# Patient Record
Sex: MALE | Race: BLACK OR AFRICAN AMERICAN | NOT HISPANIC OR LATINO | URBAN - METROPOLITAN AREA
[De-identification: names, ages, dates, MRNs, and addresses within clinical notes are randomized per-mention and may not be internally consistent; named-entity substitution may affect disease eponyms.]

---

## 2018-09-04 ENCOUNTER — IMPORTED ENCOUNTER (OUTPATIENT)
Dept: URBAN - METROPOLITAN AREA CLINIC 31 | Facility: CLINIC | Age: 53
End: 2018-09-04

## 2018-09-04 PROBLEM — E11.9: Noted: 2018-09-04

## 2018-09-04 PROCEDURE — 92004 COMPRE OPH EXAM NEW PT 1/>: CPT

## 2018-09-04 NOTE — PATIENT DISCUSSION
DM II without sign of Diabetic Retinopathy OU:  Discussed the pathophysiology of diabetes and its effect on the eye. Stressed the importance of regular followup and good control of BS BP and Lipids to avoid future complications. Return for an appointment in 12 months for comprehensive exam. with Dr. Yohana Poole.

## 2019-11-18 ENCOUNTER — IMPORTED ENCOUNTER (OUTPATIENT)
Dept: URBAN - METROPOLITAN AREA CLINIC 31 | Facility: CLINIC | Age: 54
End: 2019-11-18

## 2019-11-18 PROBLEM — E11.3293: Noted: 2019-11-18

## 2019-11-18 PROBLEM — E11.9: Noted: 2019-11-18

## 2019-11-18 PROBLEM — H25.13: Noted: 2019-11-18

## 2019-11-18 PROBLEM — E11.3291: Noted: 2019-11-18

## 2019-11-18 PROCEDURE — 92014 COMPRE OPH EXAM EST PT 1/>: CPT

## 2019-11-18 PROCEDURE — 92015 DETERMINE REFRACTIVE STATE: CPT

## 2019-11-18 NOTE — PATIENT DISCUSSION
DM II without sign of Diabetic Retinopathy OS:  Discussed the pathophysiology of diabetes and its effect on the eye. Stressed the importance of regular followup and good control of BS BP and Lipids to avoid future complications.

## 2019-11-18 NOTE — PATIENT DISCUSSION
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 Stressed the importance of regular followup and good control of BS BP and Lipids to avoid future complications.

## 2019-11-18 NOTE — PATIENT DISCUSSION
Nuclear Sclerotic Cataract OU: Explained how cataracts can effect vision. Recommend clinical observation. The patient was advised to contact us if any change or worsening of vision. Final glasses rx given today.

## 2019-11-18 NOTE — PATIENT DISCUSSION
1. DM II with mild Non-proliferative Diabetic Retinopathy OD:  Discussed the pathophysiology of diabetes and its effect on the eye. Stressed the importance of regular followup and good control of BS BP and Lipids to avoid future complications. 2.  DM II without sign of Diabetic Retinopathy OS:  Discussed the pathophysiology of diabetes and its effect on the eye. Stressed the importance of regular followup and good control of BS BP and Lipids to avoid future complications. 3. Nuclear Sclerotic Cataract OU: Explained how cataracts can effect vision. Recommend clinical observation. The patient was advised to contact us if any change or worsening of vision. Final glasses rx given today. Return for an appointment in 6 months for dilated fundus exam. F/U DM with Dr. Chela Gabriel.

## 2020-05-20 ENCOUNTER — IMPORTED ENCOUNTER (OUTPATIENT)
Dept: URBAN - METROPOLITAN AREA CLINIC 31 | Facility: CLINIC | Age: 55
End: 2020-05-20

## 2020-05-20 PROBLEM — E11.9: Noted: 2020-05-20

## 2020-05-20 PROBLEM — E11.3291: Noted: 2020-05-20

## 2020-05-20 PROBLEM — H25.13: Noted: 2020-05-20

## 2020-05-20 PROCEDURE — 99214 OFFICE O/P EST MOD 30 MIN: CPT

## 2020-05-20 NOTE — PATIENT DISCUSSION
1. DM II with mild Non-proliferative Diabetic Retinopathy OD:  Discussed the pathophysiology of diabetes and its effect on the eye. Stressed the importance of regular followup and good control of BS BP and Lipids to avoid future complications. 2.  DM II without sign of Diabetic Retinopathy OS:  Discussed the pathophysiology of diabetes and its effect on the eye. Stressed the importance of regular followup and good control of BS BP and Lipids to avoid future complications. Pt has had VA improvement OU: since last exam and since diabetic medication changes. 3. Nuclear Sclerotic Cataract OU: Explained how cataracts can effect vision. Recommend clinical observation. The patient was advised to contact us if any change or worsening of vision. Followup on 1 year for comprehensive exam. with Dr. Elias Ashby.

## 2022-04-02 ASSESSMENT — VISUAL ACUITY
OD_CC: 20/20
OD_PH: SC 20/30
OS_CC: 20/80
OD_CC: 20/80-1
OS_CC: 20/20-1
OD_CC: 20/20-1
OS_PH: SC 20/25
OS_CC: 20/20-1

## 2022-04-02 ASSESSMENT — TONOMETRY
OD_IOP_MMHG: 14
OS_IOP_MMHG: 15
OD_IOP_MMHG: 12
OS_IOP_MMHG: 15
OS_IOP_MMHG: 12
OD_IOP_MMHG: 15